# Patient Record
Sex: FEMALE | Race: WHITE | NOT HISPANIC OR LATINO | Employment: UNEMPLOYED | URBAN - METROPOLITAN AREA
[De-identification: names, ages, dates, MRNs, and addresses within clinical notes are randomized per-mention and may not be internally consistent; named-entity substitution may affect disease eponyms.]

---
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
## 2024-03-22 ENCOUNTER — OFFICE VISIT (OUTPATIENT)
Dept: FAMILY MEDICINE CLINIC | Facility: CLINIC | Age: 19
End: 2024-03-22
Payer: COMMERCIAL

## 2024-03-22 VITALS
DIASTOLIC BLOOD PRESSURE: 76 MMHG | BODY MASS INDEX: 35.97 KG/M2 | TEMPERATURE: 98.6 F | HEIGHT: 66 IN | HEART RATE: 94 BPM | OXYGEN SATURATION: 99 % | WEIGHT: 223.8 LBS | SYSTOLIC BLOOD PRESSURE: 132 MMHG

## 2024-03-22 DIAGNOSIS — Z71.85 HPV VACCINE COUNSELING: ICD-10-CM

## 2024-03-22 DIAGNOSIS — F41.1 GENERALIZED ANXIETY DISORDER: Primary | ICD-10-CM

## 2024-03-22 DIAGNOSIS — Z13.1 DIABETES MELLITUS SCREENING: ICD-10-CM

## 2024-03-22 DIAGNOSIS — Z13.29 THYROID DISORDER SCREENING: ICD-10-CM

## 2024-03-22 DIAGNOSIS — Z11.59 NEED FOR HEPATITIS C SCREENING TEST: ICD-10-CM

## 2024-03-22 DIAGNOSIS — Z13.220 LIPID SCREENING: ICD-10-CM

## 2024-03-22 LAB
ALBUMIN SERPL-MCNC: 4.8 G/DL (ref 3.5–5.2)
ALBUMIN/GLOB SERPL: 2 G/DL
ALP SERPL-CCNC: 83 U/L (ref 39–117)
ALT SERPL W P-5'-P-CCNC: 15 U/L (ref 1–33)
ANION GAP SERPL CALCULATED.3IONS-SCNC: 14 MMOL/L (ref 5–15)
AST SERPL-CCNC: 17 U/L (ref 1–32)
BASOPHILS # BLD AUTO: 0.02 10*3/MM3 (ref 0–0.2)
BASOPHILS NFR BLD AUTO: 0.4 % (ref 0–1.5)
BILIRUB SERPL-MCNC: 0.7 MG/DL (ref 0–1.2)
BUN SERPL-MCNC: 3 MG/DL (ref 6–20)
BUN/CREAT SERPL: 2.7 (ref 7–25)
CALCIUM SPEC-SCNC: 9.6 MG/DL (ref 8.6–10.5)
CHLORIDE SERPL-SCNC: 103 MMOL/L (ref 98–107)
CHOLEST SERPL-MCNC: 198 MG/DL (ref 0–200)
CO2 SERPL-SCNC: 23 MMOL/L (ref 22–29)
CREAT SERPL-MCNC: 1.12 MG/DL (ref 0.57–1)
DEPRECATED RDW RBC AUTO: 42.7 FL (ref 37–54)
EGFRCR SERPLBLD CKD-EPI 2021: 72.8 ML/MIN/1.73
EOSINOPHIL # BLD AUTO: 0.05 10*3/MM3 (ref 0–0.4)
EOSINOPHIL NFR BLD AUTO: 0.9 % (ref 0.3–6.2)
ERYTHROCYTE [DISTWIDTH] IN BLOOD BY AUTOMATED COUNT: 12.5 % (ref 12.3–15.4)
GLOBULIN UR ELPH-MCNC: 2.4 GM/DL
GLUCOSE SERPL-MCNC: 91 MG/DL (ref 65–99)
HCT VFR BLD AUTO: 41.8 % (ref 34–46.6)
HCV AB SER DONR QL: NORMAL
HDLC SERPL QL: 4.71
HDLC SERPL-MCNC: 42 MG/DL (ref 40–60)
HGB BLD-MCNC: 14 G/DL (ref 12–15.9)
IMM GRANULOCYTES # BLD AUTO: 0.01 10*3/MM3 (ref 0–0.05)
IMM GRANULOCYTES NFR BLD AUTO: 0.2 % (ref 0–0.5)
LDLC SERPL CALC-MCNC: 138 MG/DL (ref 0–100)
LYMPHOCYTES # BLD AUTO: 1.82 10*3/MM3 (ref 0.7–3.1)
LYMPHOCYTES NFR BLD AUTO: 34 % (ref 19.6–45.3)
MCH RBC QN AUTO: 31 PG (ref 26.6–33)
MCHC RBC AUTO-ENTMCNC: 33.5 G/DL (ref 31.5–35.7)
MCV RBC AUTO: 92.5 FL (ref 79–97)
MONOCYTES # BLD AUTO: 0.43 10*3/MM3 (ref 0.1–0.9)
MONOCYTES NFR BLD AUTO: 8 % (ref 5–12)
NEUTROPHILS NFR BLD AUTO: 3.03 10*3/MM3 (ref 1.7–7)
NEUTROPHILS NFR BLD AUTO: 56.5 % (ref 42.7–76)
NRBC BLD AUTO-RTO: 0 /100 WBC (ref 0–0.2)
PLATELET # BLD AUTO: 320 10*3/MM3 (ref 140–450)
PMV BLD AUTO: 10.7 FL (ref 6–12)
POTASSIUM SERPL-SCNC: 3.8 MMOL/L (ref 3.5–5.2)
PROT SERPL-MCNC: 7.2 G/DL (ref 6–8.5)
RBC # BLD AUTO: 4.52 10*6/MM3 (ref 3.77–5.28)
SODIUM SERPL-SCNC: 140 MMOL/L (ref 136–145)
T4 FREE SERPL-MCNC: 1.06 NG/DL (ref 0.93–1.7)
TRIGL SERPL-MCNC: 100 MG/DL (ref 0–150)
TSH SERPL DL<=0.05 MIU/L-ACNC: 2.87 UIU/ML (ref 0.27–4.2)
VLDLC SERPL-MCNC: 18 MG/DL (ref 5–40)
WBC NRBC COR # BLD AUTO: 5.36 10*3/MM3 (ref 3.4–10.8)

## 2024-03-22 PROCEDURE — 80061 LIPID PANEL: CPT | Performed by: NURSE PRACTITIONER

## 2024-03-22 PROCEDURE — 86803 HEPATITIS C AB TEST: CPT | Performed by: NURSE PRACTITIONER

## 2024-03-22 PROCEDURE — 84439 ASSAY OF FREE THYROXINE: CPT | Performed by: NURSE PRACTITIONER

## 2024-03-22 PROCEDURE — 80050 GENERAL HEALTH PANEL: CPT | Performed by: NURSE PRACTITIONER

## 2024-03-22 RX ORDER — ESCITALOPRAM OXALATE 10 MG/1
10 TABLET ORAL DAILY
Qty: 90 TABLET | Refills: 1 | Status: SHIPPED | OUTPATIENT
Start: 2024-03-22

## 2024-03-22 NOTE — PROGRESS NOTES
Chief Complaint  Annual Exam    Subjective          Anju Blank is a 18 y.o. female who presents to [facility]    History of Present Illness    Here today to get labs done.    Seeing a therapist for anxiety and its going good. Anxiety is causing her to over think about everything.  Recently ended long term relationship.    PHQ-2 Total Score: 0   PHQ-9 Total Score: 0     Review of Systems   Constitutional:  Negative for chills, fatigue and fever.   Respiratory:  Negative for cough and shortness of breath.    Cardiovascular:  Negative for chest pain and palpitations.   Gastrointestinal:  Negative for constipation, diarrhea, nausea and vomiting.   Musculoskeletal:  Negative for back pain and neck pain.   Skin:  Negative for rash.   Neurological:  Negative for dizziness and headaches.          Medical History: has a past medical history of Pain, knee.     Surgical History: has a past surgical history that includes Dental surgery.     Family History: family history includes Heart disease in her father and mother; Rheum arthritis in an other family member.     Social History: reports that she has never smoked. She has never been exposed to tobacco smoke. She has never used smokeless tobacco. She reports that she does not drink alcohol and does not use drugs.    Allergies: Patient has no known allergies.      Health Maintenance Due   Topic Date Due    HEPATITIS C SCREENING  Never done    HPV VACCINES (2 - 3-dose series) 04/19/2024            Current Outpatient Medications:     escitalopram (Lexapro) 10 MG tablet, Take 1 tablet by mouth Daily., Disp: 90 tablet, Rfl: 1      Immunization History   Administered Date(s) Administered    COVID-19 (PFIZER) Purple Cap Monovalent 08/08/2021, 08/29/2021    Fluzone (or Fluarix & Flulaval for VFC) >6mos 11/23/2022    Hpv9 03/22/2024         Objective       Vitals:    03/22/24 0734   BP: 132/76   BP Location: Right arm   Patient Position:  "Sitting   Cuff Size: Adult   Pulse: 94   Temp: 98.6 °F (37 °C)   TempSrc: Temporal   SpO2: 99%   Weight: 102 kg (223 lb 12.8 oz)   Height: 167.6 cm (66\")   PainSc: 0-No pain      Body mass index is 36.12 kg/m².   Wt Readings from Last 3 Encounters:   03/22/24 102 kg (223 lb 12.8 oz) (99%, Z= 2.25)*   11/23/22 92.5 kg (204 lb) (98%, Z= 2.03)*   10/30/22 81.6 kg (180 lb) (95%, Z= 1.68)*     * Growth percentiles are based on CDC (Girls, 2-20 Years) data.      BP Readings from Last 3 Encounters:   03/22/24 132/76   11/23/22 118/54   10/30/22 137/73        Pediatric BMI = 97 %ile (Z= 1.94) based on CDC (Girls, 2-20 Years) BMI-for-age based on BMI available as of 3/22/2024.. Pediatric BMI = 97 %ile (Z= 1.94) based on CDC (Girls, 2-20 Years) BMI-for-age based on BMI available as of 3/22/2024.. Class 2 Severe Obesity (BMI >=35 and <=39.9). Obesity-related health conditions include the following: none. Obesity is newly identified. BMI is is above average; BMI management plan is completed. We discussed portion control and increasing exercise.       Physical Exam  Vitals reviewed.   Constitutional:       Appearance: Normal appearance.   HENT:      Head: Normocephalic and atraumatic.   Cardiovascular:      Rate and Rhythm: Normal rate and regular rhythm.      Pulses: Normal pulses.      Heart sounds: Normal heart sounds.   Pulmonary:      Effort: Pulmonary effort is normal.      Breath sounds: Normal breath sounds.   Skin:     General: Skin is warm and dry.   Neurological:      Mental Status: She is alert and oriented to person, place, and time.   Psychiatric:         Mood and Affect: Mood normal.         Behavior: Behavior normal.         Thought Content: Thought content normal.         Judgment: Judgment normal.             Result Review :                          Assessment and Plan        Diagnoses and all orders for this visit:    1. Generalized anxiety disorder (Primary)  Comments:  Start lexapro today, follow up in 6 " weeks.  Orders:  -     escitalopram (Lexapro) 10 MG tablet; Take 1 tablet by mouth Daily.  Dispense: 90 tablet; Refill: 1    2. Need for hepatitis C screening test  -     Hepatitis C Antibody    3. Lipid screening  -     Lipid Panel With / Chol / HDL Ratio  -     CBC & Differential    4. Diabetes mellitus screening  -     Comprehensive Metabolic Panel    5. Thyroid disorder screening  -     T4, Free  -     TSH    6. HPV vaccine counseling  -     HPV Vaccine (HPV9)          Follow Up     Return in 6 weeks (on 5/3/2024), or if symptoms worsen or fail to improve.    Patient was given instructions and counseling regarding her condition or for health maintenance advice. Please see specific information pulled into the AVS if appropriate. Patient understands the importance of having any ordered tests to be performed in a timely fashion.        Sommer Hancock, APRN

## 2024-05-31 ENCOUNTER — OFFICE VISIT (OUTPATIENT)
Dept: FAMILY MEDICINE CLINIC | Facility: CLINIC | Age: 19
End: 2024-05-31
Payer: COMMERCIAL

## 2024-05-31 VITALS
HEIGHT: 66 IN | BODY MASS INDEX: 36.52 KG/M2 | OXYGEN SATURATION: 100 % | TEMPERATURE: 98.2 F | HEART RATE: 86 BPM | WEIGHT: 227.2 LBS | SYSTOLIC BLOOD PRESSURE: 123 MMHG | DIASTOLIC BLOOD PRESSURE: 75 MMHG

## 2024-05-31 DIAGNOSIS — F41.1 GENERALIZED ANXIETY DISORDER: Primary | ICD-10-CM

## 2024-05-31 DIAGNOSIS — F33.0 MILD EPISODE OF RECURRENT MAJOR DEPRESSIVE DISORDER: ICD-10-CM

## 2024-05-31 PROCEDURE — 99213 OFFICE O/P EST LOW 20 MIN: CPT | Performed by: NURSE PRACTITIONER

## 2024-05-31 RX ORDER — DESVENLAFAXINE SUCCINATE 50 MG/1
50 TABLET, EXTENDED RELEASE ORAL DAILY
Qty: 90 TABLET | Refills: 3 | Status: SHIPPED | OUTPATIENT
Start: 2024-05-31

## 2024-05-31 NOTE — PROGRESS NOTES
"Chief Complaint  Generalized anxiety disorder (6wk f/u) and Abdominal Pain (Side pain- intermittently )    Subjective          Anju Blank is a 18 y.o. female who presents to [facility]    History of Present Illness    Anxiety: seeing a therapist in [place].  She has her doing a quiz once a week that rates her anxiety and depression. Has noticed her anxiety is controlled more but her depression is more prominent.  Headed to [place] tomorrow for 2 month mission trip.    PHQ-2 Total Score:     PHQ-9 Total Score:          Review of Systems       Medical History: has a past medical history of Pain, knee.     Surgical History: has a past surgical history that includes Dental surgery.     Family History: family history includes Heart disease in her father and mother; Rheum arthritis in an other family member.     Social History: reports that she has never smoked. She has never been exposed to tobacco smoke. She has never used smokeless tobacco. She reports that she does not drink alcohol and does not use drugs.    Allergies: Patient has no known allergies.      Health Maintenance Due   Topic Date Due    HPV VACCINES (2 - 3-dose series) 04/19/2024            Current Outpatient Medications:     desvenlafaxine (Pristiq) 50 MG 24 hr tablet, Take 1 tablet by mouth Daily., Disp: 90 tablet, Rfl: 3      Immunization History   Administered Date(s) Administered    COVID-19 (PFIZER) Purple Cap Monovalent 08/08/2021, 08/29/2021    Fluzone (or Fluarix & Flulaval for VFC) >6mos 11/23/2022    Hpv9 03/22/2024         Objective       Vitals:    05/31/24 0722   BP: 123/75   BP Location: Right arm   Patient Position: Sitting   Cuff Size: Adult   Pulse: 86   Temp: 98.2 °F (36.8 °C)   TempSrc: Temporal   SpO2: 100%   Weight: 103 kg (227 lb 3.2 oz)   Height: 167.6 cm (66\")   PainSc: 0-No pain      Body mass index is 36.67 kg/m².   Wt Readings from Last 3 Encounters:   05/31/24 103 kg (227 lb 3.2 " oz) (99%, Z= 2.29)*   03/22/24 102 kg (223 lb 12.8 oz) (99%, Z= 2.25)*   11/23/22 92.5 kg (204 lb) (98%, Z= 2.03)*     * Growth percentiles are based on [facility] (Girls, 2-20 Years) data.      BP Readings from Last 3 Encounters:   05/31/24 123/75   03/22/24 132/76   11/23/22 118/54                Physical Exam  Vitals reviewed.   Constitutional:       Appearance: Normal appearance.   Cardiovascular:      Rate and Rhythm: Normal rate and regular rhythm.      Pulses: Normal pulses.      Heart sounds: Normal heart sounds.   Pulmonary:      Effort: Pulmonary effort is normal.      Breath sounds: Normal breath sounds.   Skin:     General: Skin is warm and dry.   Neurological:      Mental Status: She is alert and oriented to person, place, and time.   Psychiatric:         Mood and Affect: Mood normal.         Behavior: Behavior normal.         Thought Content: Thought content normal.         Judgment: Judgment normal.             Result Review :       Common labs          3/22/2024    08:10   Common Labs   Glucose 91    BUN 3    Creatinine 1.12    Sodium 140    Potassium 3.8    Chloride 103    Calcium 9.6    Albumin 4.8    Total Bilirubin 0.7    Alkaline Phosphatase 83    AST (SGOT) 17    ALT (SGPT) 15    WBC 5.36    Hemoglobin 14.0    Hematocrit 41.8    Platelets 320    Total Cholesterol 198    Triglycerides 100    HDL Cholesterol 42    LDL Cholesterol  138                       Assessment and Plan        Diagnoses and all orders for this visit:    1. Generalized anxiety disorder (Primary)  Comments:  stop lexapro and start Pristiq 50 mg  Orders:  -     desvenlafaxine (Pristiq) 50 MG 24 hr tablet; Take 1 tablet by mouth Daily.  Dispense: 90 tablet; Refill: 3    2. Mild episode of recurrent major depressive disorder  Comments:  Start Pristiq 50 mg daily, continue therapy  Orders:  -     desvenlafaxine (Pristiq) 50 MG 24 hr tablet; Take 1 tablet by mouth Daily.  Dispense: 90 tablet; Refill: 3          Follow Up     Return in  about 10 weeks (around 8/9/2024).    Patient was given instructions and counseling regarding her condition or for health maintenance advice. Please see specific information pulled into the AVS if appropriate. Patient understands the importance of having any ordered tests to be performed in a timely fashion.    I spent 10 minutes caring for Anju on this date of service. This time includes time spent by me in the following activities: preparing for the visit, reviewing tests, and ordering medications, tests, or procedures      MIGUEL Barry    Answers submitted by the patient for this visit:  Primary Reason for Visit (Submitted on 5/31/2024)  What is the primary reason for your visit?: Anxiety

## 2024-08-06 ENCOUNTER — OFFICE VISIT (OUTPATIENT)
Dept: FAMILY MEDICINE CLINIC | Facility: CLINIC | Age: 19
End: 2024-08-06
Payer: COMMERCIAL

## 2024-08-06 VITALS
OXYGEN SATURATION: 98 % | DIASTOLIC BLOOD PRESSURE: 72 MMHG | HEART RATE: 79 BPM | TEMPERATURE: 99.3 F | BODY MASS INDEX: 36.87 KG/M2 | HEIGHT: 66 IN | SYSTOLIC BLOOD PRESSURE: 135 MMHG | WEIGHT: 229.4 LBS

## 2024-08-06 DIAGNOSIS — F41.1 GENERALIZED ANXIETY DISORDER: Primary | ICD-10-CM

## 2024-08-06 PROCEDURE — 99213 OFFICE O/P EST LOW 20 MIN: CPT | Performed by: NURSE PRACTITIONER

## 2024-08-06 NOTE — PROGRESS NOTES
"Chief Complaint  Generalized anxiety disorder (10 week follow up)    Subjective          Anju Blank is a 18 y.o. female who presents to [facility]    History of Present Illness    Anxiety: feeling good on Pristiq.  Anxiety improved.  Moving back to  8/15    PHQ-2 Total Score:     PHQ-9 Total Score:          Review of Systems       Medical History: has a past medical history of Pain, knee.     Surgical History: has a past surgical history that includes Dental surgery.     Family History: family history includes Heart disease in her father and mother; Rheum arthritis in an other family member.     Social History: reports that she has never smoked. She has never been exposed to tobacco smoke. She has never used smokeless tobacco. She reports that she does not drink alcohol and does not use drugs.    Allergies: Patient has no known allergies.      Health Maintenance Due   Topic Date Due    HPV VACCINES (2 - 3-dose series) 04/19/2024            Current Outpatient Medications:     desvenlafaxine (Pristiq) 50 MG 24 hr tablet, Take 1 tablet by mouth Daily., Disp: 90 tablet, Rfl: 3      Immunization History   Administered Date(s) Administered    COVID-19 (PFIZER) Purple Cap Monovalent 08/08/2021, 08/29/2021    Fluzone (or Fluarix & Flulaval for VFC) >6mos 11/23/2022    Hpv9 03/22/2024         Objective       Vitals:    08/06/24 1323   BP: 135/72   BP Location: Right arm   Patient Position: Sitting   Cuff Size: Adult   Pulse: 79   Temp: 99.3 °F (37.4 °C)   TempSrc: Temporal   SpO2: 98%   Weight: 104 kg (229 lb 6.4 oz)   Height: 167.6 cm (65.98\")      Body mass index is 37.04 kg/m².   Wt Readings from Last 3 Encounters:   08/06/24 104 kg (229 lb 6.4 oz) (99%, Z= 2.32)*   05/31/24 103 kg (227 lb 3.2 oz) (99%, Z= 2.29)*   03/22/24 102 kg (223 lb 12.8 oz) (99%, Z= 2.25)*     * Growth percentiles are based on CDC (Girls, 2-20 Years) data.      BP Readings from Last 3 Encounters: "   08/06/24 135/72   05/31/24 123/75   03/22/24 132/76                Physical Exam  Vitals reviewed.   Constitutional:       Appearance: Normal appearance.   Skin:     General: Skin is warm and dry.   Neurological:      Mental Status: She is alert and oriented to person, place, and time.   Psychiatric:         Mood and Affect: Mood normal.         Behavior: Behavior normal.         Thought Content: Thought content normal.         Judgment: Judgment normal.             Result Review :       Common labs          3/22/2024    08:10   Common Labs   Glucose 91    BUN 3    Creatinine 1.12    Sodium 140    Potassium 3.8    Chloride 103    Calcium 9.6    Albumin 4.8    Total Bilirubin 0.7    Alkaline Phosphatase 83    AST (SGOT) 17    ALT (SGPT) 15    WBC 5.36    Hemoglobin 14.0    Hematocrit 41.8    Platelets 320    Total Cholesterol 198    Triglycerides 100    HDL Cholesterol 42    LDL Cholesterol  138                       Assessment and Plan        Diagnoses and all orders for this visit:    1. Generalized anxiety disorder (Primary)  Comments:  Doing well on Pristiq          Follow Up     Return if symptoms worsen or fail to improve.    Patient was given instructions and counseling regarding her condition or for health maintenance advice. Please see specific information pulled into the AVS if appropriate. Patient understands the importance of having any ordered tests to be performed in a timely fashion.      I spent 5 minutes caring for Anju on this date of service. This time includes time spent by me in the following activities: preparing for the visit, reviewing tests, performing a medically appropriate examination and/or evaluation, counseling and educating the patient/family/caregiver, and referring and communicating with other health care professionals      MIGUEL Barry

## 2025-08-28 ENCOUNTER — OFFICE VISIT (OUTPATIENT)
Dept: FAMILY MEDICINE CLINIC | Facility: CLINIC | Age: 20
End: 2025-08-28
Payer: COMMERCIAL

## 2025-08-28 VITALS
WEIGHT: 242 LBS | BODY MASS INDEX: 39.08 KG/M2 | DIASTOLIC BLOOD PRESSURE: 82 MMHG | TEMPERATURE: 98.9 F | OXYGEN SATURATION: 100 % | HEART RATE: 92 BPM | SYSTOLIC BLOOD PRESSURE: 118 MMHG

## 2025-08-28 DIAGNOSIS — L68.9 EXCESSIVE HAIR GROWTH: ICD-10-CM

## 2025-08-28 DIAGNOSIS — F41.1 GENERALIZED ANXIETY DISORDER: Primary | ICD-10-CM

## 2025-08-28 PROBLEM — F33.0 MILD EPISODE OF RECURRENT MAJOR DEPRESSIVE DISORDER: Status: ACTIVE | Noted: 2025-08-28

## 2025-08-28 RX ORDER — DESVENLAFAXINE 50 MG/1
50 TABLET, FILM COATED, EXTENDED RELEASE ORAL DAILY
Qty: 90 TABLET | Refills: 3 | Status: SHIPPED | OUTPATIENT
Start: 2025-08-28

## 2025-08-28 RX ORDER — SPIRONOLACTONE 25 MG/1
25 TABLET ORAL DAILY
Qty: 30 TABLET | Refills: 2 | Status: SHIPPED | OUTPATIENT
Start: 2025-08-28